# Patient Record
Sex: FEMALE | Race: ASIAN | NOT HISPANIC OR LATINO | URBAN - METROPOLITAN AREA
[De-identification: names, ages, dates, MRNs, and addresses within clinical notes are randomized per-mention and may not be internally consistent; named-entity substitution may affect disease eponyms.]

---

## 2021-07-26 ENCOUNTER — TRANSFERRED RECORDS (OUTPATIENT)
Dept: MULTI SPECIALTY CLINIC | Facility: CLINIC | Age: 21
End: 2021-07-26

## 2021-07-26 LAB — PAP-ABSTRACT: NORMAL

## 2023-05-21 ENCOUNTER — HEALTH MAINTENANCE LETTER (OUTPATIENT)
Age: 23
End: 2023-05-21

## 2023-06-30 ENCOUNTER — OFFICE VISIT (OUTPATIENT)
Dept: FAMILY MEDICINE | Facility: CLINIC | Age: 23
End: 2023-06-30
Payer: COMMERCIAL

## 2023-06-30 VITALS
WEIGHT: 174.9 LBS | DIASTOLIC BLOOD PRESSURE: 66 MMHG | RESPIRATION RATE: 12 BRPM | HEIGHT: 62 IN | BODY MASS INDEX: 32.18 KG/M2 | OXYGEN SATURATION: 98 % | TEMPERATURE: 97.8 F | SYSTOLIC BLOOD PRESSURE: 94 MMHG | HEART RATE: 82 BPM

## 2023-06-30 DIAGNOSIS — H65.92 OME (OTITIS MEDIA WITH EFFUSION), LEFT: ICD-10-CM

## 2023-06-30 DIAGNOSIS — R30.0 DYSURIA: ICD-10-CM

## 2023-06-30 DIAGNOSIS — M54.16 LUMBAR RADICULITIS: Primary | ICD-10-CM

## 2023-06-30 DIAGNOSIS — K60.2 ANAL FISSURE: ICD-10-CM

## 2023-06-30 PROCEDURE — 99203 OFFICE O/P NEW LOW 30 MIN: CPT | Performed by: NURSE PRACTITIONER

## 2023-06-30 RX ORDER — GABAPENTIN 100 MG/1
100 CAPSULE ORAL 3 TIMES DAILY
Qty: 90 CAPSULE | Refills: 3 | Status: SHIPPED | OUTPATIENT
Start: 2023-06-30

## 2023-06-30 RX ORDER — GABAPENTIN 100 MG/1
100 CAPSULE ORAL
COMMUNITY
Start: 2022-01-20 | End: 2023-06-30

## 2023-06-30 RX ORDER — HYDROCORTISONE 25 MG/G
CREAM TOPICAL 2 TIMES DAILY PRN
Qty: 28 G | Refills: 1 | Status: SHIPPED | OUTPATIENT
Start: 2023-06-30

## 2023-06-30 ASSESSMENT — PATIENT HEALTH QUESTIONNAIRE - PHQ9
SUM OF ALL RESPONSES TO PHQ QUESTIONS 1-9: 15
SUM OF ALL RESPONSES TO PHQ QUESTIONS 1-9: 15
10. IF YOU CHECKED OFF ANY PROBLEMS, HOW DIFFICULT HAVE THESE PROBLEMS MADE IT FOR YOU TO DO YOUR WORK, TAKE CARE OF THINGS AT HOME, OR GET ALONG WITH OTHER PEOPLE: SOMEWHAT DIFFICULT

## 2023-06-30 ASSESSMENT — PAIN SCALES - GENERAL: PAINLEVEL: NO PAIN (0)

## 2023-06-30 NOTE — PROGRESS NOTES
Assessment and Plan:     Lumbar radiculitis  Discussed treatment options.  We will start gabapentin 100 mg daily.  Will increase dose by 1 capsule once weekly until she is taking 100 mg 3 times daily.  Educated on its indications and side effects.  Offered referral to spine clinic, but she declines.  If no improvement in symptoms, patient may consider referral in the future.  - gabapentin (NEURONTIN) 100 MG capsule  Dispense: 90 capsule; Refill: 3    Anal fissure  We will treat with Proctosol cream.  She is to keep bowel movements soft with over-the-counter stool softener.  Will refer to colorectal specialist if symptoms persist.  - hydrocortisone, Perianal, (PROCTOSOL HC) 2.5 % cream  Dispense: 28 g; Refill: 1  - Adult Colorectal Surgery  Referral    OME (otitis media with effusion), left  Discussed treatment options.  Patient may take over-the-counter Sudafed daily for 2 to 3 days.  She may also try an over-the-counter antihistamine or Flonase.  If no improvement symptoms, she is to follow-up.  She is content with the plan.    Dysuria  Offered urinalysis and wet prep, but she declines.  Symptoms have resolved.  She will follow-up with a return.    Subjective:     Amadou Pablo is a 23 year old female presenting to the clinic for multiple concerns today.  Patient has been experiencing rectal bleeding intermittently for 1 year.  This occurs when she has a bowel movement.  She has noticed this on the toilet paper and in the stool.  Blood is bright red.  She denies black, tarry stools.  She has not had any abdominal pain or discomfort.  She is having bowel movements once daily, but strains when she has a bowel movement.  Patient had dysuria 1 month ago.  This lasted for 3 weeks.  She has been asymptomatic for 1 week.  She denies urinary urgency, urinary frequency, hematuria, fever, vaginal discharge or irritation.  She has been in a relationship since March.  She is sexually active.  She has no concerns for STDs.   Patient is concerned of left ear fullness for 1 week.  Patient took a road trip to [place] 3 weeks ago.  She has not had any recent cold symptoms or fever.  She has not been swimming.  Patient has a history of lumbar radiculitis diagnosed in 2020.  She this occurred after sustaining an injury at work for [employer].  She received 2 cortisone injections.  Symptoms did improve, but returned over the past few months.  She has been experiencing pain daily.  She has had difficulty sleeping.  Pain is an intermittent sharp sensation which radiates from her right buttock down her leg to her shin.  She denies any urinary or fecal incontinence or retention.  Patient took gabapentin in the past with some relief.    Reviewof Systems: A complete 14 point review of systems was obtained and is negative or as stated in the history of present illness.    Social History     Socioeconomic History     Marital status: Patient Declined     Spouse name: Not on file     Number of children: Not on file     Years of education: Not on file     Highest education level: Not on file   Occupational History     Not on file   Tobacco Use     Smoking status: Never     Smokeless tobacco: Never   Vaping Use     Vaping Use: Never used   Substance and Sexual Activity     Alcohol use: Not on file     Drug use: Not on file     Sexual activity: Not on file   Other Topics Concern     Not on file   Social History Narrative     Not on file     Social Determinants of Health     Financial Resource Strain: Not on file   Food Insecurity: Not on file   Transportation Needs: Not on file   Physical Activity: Not on file   Stress: Not on file   Social Connections: Not on file   Intimate Partner Violence: Not on file   Housing Stability: Not on file       Active Ambulatory Problems     Diagnosis Date Noted     No Active Ambulatory Problems     Resolved Ambulatory Problems     Diagnosis Date Noted     No Resolved Ambulatory Problems     No Additional Past Medical History  "      No family history on file.    Objective:     BP 94/66   Pulse 82   Temp 97.8  F (36.6  C)   Resp 12   Ht 1.564 m (5' 1.58\")   Wt 79.3 kg (174 lb 14.4 oz)   LMP 06/28/2023   SpO2 98%   BMI 32.43 kg/m      Patient is alert, in no obvious distress.   Skin: Warm, dry.    Lungs:  Clear to auscultation. Respirations even and unlabored.  No wheezing or rales noted.   Heart:  Regular rate and rhythm.  No murmurs, S3, S4, gallops, or rubs.    Abdomen: Soft, nontender.  No organomegaly. Bowel sounds normoactive. No guarding or masses noted.   Rectal:  Small anal fissure present around her left upper anus.   Musculoskeletal:  Full ROM of extremities.  Tenderness to palpation right sciatic region.  Patellar and Achilles DTRs +2, symmetrical.      Answers for HPI/ROS submitted by the patient on 6/30/2023  If you checked off any problems, how difficult have these problems made it for you to do your work, take care of things at home, or get along with other people?: Somewhat difficult  PHQ9 TOTAL SCORE: 15  What is the reason for your visit today? : Health check on private part and left ear  How many servings of fruits and vegetables do you eat daily?: 0-1  On average, how many sweetened beverages do you drink each day (Examples: soda, juice, sweet tea, etc.  Do NOT count diet or artificially sweetened beverages)?: 1  How many minutes a day do you exercise enough to make your heart beat faster?: 10 to 19  How many days a week do you exercise enough to make your heart beat faster?: 3 or less  How many days per week do you miss taking your medication?: 0      "

## 2024-07-28 ENCOUNTER — HEALTH MAINTENANCE LETTER (OUTPATIENT)
Age: 24
End: 2024-07-28

## 2025-08-10 ENCOUNTER — HEALTH MAINTENANCE LETTER (OUTPATIENT)
Age: 25
End: 2025-08-10